# Patient Record
Sex: FEMALE | Race: WHITE | NOT HISPANIC OR LATINO | Employment: OTHER | URBAN - METROPOLITAN AREA
[De-identification: names, ages, dates, MRNs, and addresses within clinical notes are randomized per-mention and may not be internally consistent; named-entity substitution may affect disease eponyms.]

---

## 2024-02-01 ENCOUNTER — HOSPITAL ENCOUNTER (OUTPATIENT)
Dept: RADIOLOGY | Facility: HOSPITAL | Age: 84
Discharge: HOME | End: 2024-02-01
Payer: MEDICARE

## 2024-02-01 DIAGNOSIS — M81.8 OTHER OSTEOPOROSIS WITHOUT CURRENT PATHOLOGICAL FRACTURE: ICD-10-CM

## 2024-02-01 DIAGNOSIS — Z13.820 ENCOUNTER FOR SCREENING FOR OSTEOPOROSIS: ICD-10-CM

## 2024-02-01 PROCEDURE — 77080 DXA BONE DENSITY AXIAL: CPT

## 2024-02-01 PROCEDURE — 77080 DXA BONE DENSITY AXIAL: CPT | Performed by: RADIOLOGY

## 2024-05-08 ENCOUNTER — HOSPITAL ENCOUNTER (OUTPATIENT)
Dept: RADIOLOGY | Facility: HOSPITAL | Age: 84
Discharge: HOME | End: 2024-05-08
Payer: MEDICARE

## 2024-05-08 ENCOUNTER — HOSPITAL ENCOUNTER (OUTPATIENT)
Dept: RADIOLOGY | Facility: HOSPITAL | Age: 84
End: 2024-05-08
Payer: MEDICARE

## 2024-05-08 DIAGNOSIS — M25.552 PAIN IN LEFT HIP: ICD-10-CM

## 2024-05-08 PROCEDURE — 73552 X-RAY EXAM OF FEMUR 2/>: CPT | Mod: LT

## 2024-05-08 PROCEDURE — 73552 X-RAY EXAM OF FEMUR 2/>: CPT | Mod: LEFT SIDE | Performed by: RADIOLOGY

## 2024-06-21 ENCOUNTER — APPOINTMENT (OUTPATIENT)
Dept: CARDIOLOGY | Facility: CLINIC | Age: 84
End: 2024-06-21
Payer: MEDICARE

## 2024-06-21 VITALS
DIASTOLIC BLOOD PRESSURE: 56 MMHG | SYSTOLIC BLOOD PRESSURE: 102 MMHG | WEIGHT: 156.6 LBS | RESPIRATION RATE: 18 BRPM | HEIGHT: 64 IN | BODY MASS INDEX: 26.73 KG/M2 | HEART RATE: 76 BPM | OXYGEN SATURATION: 97 %

## 2024-06-21 DIAGNOSIS — Z79.01 CHRONIC ANTICOAGULATION: ICD-10-CM

## 2024-06-21 DIAGNOSIS — I48.0 PAROXYSMAL ATRIAL FIBRILLATION (MULTI): Primary | ICD-10-CM

## 2024-06-21 DIAGNOSIS — I34.0 MITRAL VALVE INSUFFICIENCY, UNSPECIFIED ETIOLOGY: ICD-10-CM

## 2024-06-21 PROBLEM — E11.9 DIABETES MELLITUS (MULTI): Status: ACTIVE | Noted: 2024-06-21

## 2024-06-21 PROBLEM — E87.20 METABOLIC ACIDOSIS: Status: ACTIVE | Noted: 2023-11-01

## 2024-06-21 PROBLEM — E11.9 TYPE 2 DIABETES MELLITUS WITHOUT COMPLICATION (MULTI): Status: ACTIVE | Noted: 2023-06-02

## 2024-06-21 PROBLEM — E79.0 HYPERURICEMIA: Status: ACTIVE | Noted: 2023-11-01

## 2024-06-21 PROBLEM — R80.9 PROTEINURIA: Status: ACTIVE | Noted: 2023-11-01

## 2024-06-21 PROBLEM — N28.1 SIMPLE RENAL CYST: Status: ACTIVE | Noted: 2023-11-01

## 2024-06-21 PROBLEM — I10 BENIGN ESSENTIAL HYPERTENSION: Status: ACTIVE | Noted: 2023-06-02

## 2024-06-21 PROBLEM — R00.1 BRADYCARDIA: Status: ACTIVE | Noted: 2024-06-21

## 2024-06-21 PROBLEM — M19.91 LOCALIZED, PRIMARY OSTEOARTHRITIS: Status: ACTIVE | Noted: 2024-05-15

## 2024-06-21 PROBLEM — D50.9 IRON DEFICIENCY ANEMIA: Status: ACTIVE | Noted: 2023-11-01

## 2024-06-21 PROBLEM — N18.9 ANEMIA IN CHRONIC KIDNEY DISEASE: Status: ACTIVE | Noted: 2023-11-01

## 2024-06-21 PROBLEM — G45.9 TRANSIENT ISCHEMIC ATTACK: Status: ACTIVE | Noted: 2023-06-02

## 2024-06-21 PROBLEM — N39.46 MIXED STRESS AND URGE URINARY INCONTINENCE: Status: ACTIVE | Noted: 2023-06-02

## 2024-06-21 PROBLEM — E78.5 HYPERLIPIDEMIA: Status: ACTIVE | Noted: 2024-06-21

## 2024-06-21 PROBLEM — G56.02 CARPAL TUNNEL SYNDROME OF LEFT WRIST: Status: ACTIVE | Noted: 2023-06-02

## 2024-06-21 PROBLEM — D63.1 ANEMIA IN CHRONIC KIDNEY DISEASE: Status: ACTIVE | Noted: 2023-11-01

## 2024-06-21 PROBLEM — I12.9 CHRONIC KIDNEY DISEASE DUE TO HYPERTENSION: Status: ACTIVE | Noted: 2024-06-21

## 2024-06-21 PROBLEM — S93.409A SPRAIN OF ANKLE: Status: ACTIVE | Noted: 2023-06-02

## 2024-06-21 PROBLEM — M25.562 ARTHRALGIA OF LEFT KNEE: Status: ACTIVE | Noted: 2024-05-15

## 2024-06-21 PROBLEM — E83.52 HYPERCALCEMIA: Status: ACTIVE | Noted: 2023-11-01

## 2024-06-21 PROBLEM — E07.9 THYROID DISORDER: Status: ACTIVE | Noted: 2024-06-21

## 2024-06-21 PROBLEM — E66.01 MORBID OBESITY (MULTI): Status: ACTIVE | Noted: 2024-06-21

## 2024-06-21 PROBLEM — K50.90 CROHN DISEASE (MULTI): Status: ACTIVE | Noted: 2023-06-02

## 2024-06-21 PROBLEM — I10 HYPERTENSION: Status: ACTIVE | Noted: 2024-06-21

## 2024-06-21 PROBLEM — K50.10 CROHN'S DISEASE OF LARGE BOWEL (MULTI): Status: ACTIVE | Noted: 2023-06-02

## 2024-06-21 PROBLEM — N18.32 STAGE 3B CHRONIC KIDNEY DISEASE (MULTI): Status: ACTIVE | Noted: 2023-06-02

## 2024-06-21 PROBLEM — N17.9 ACUTE RENAL FAILURE (CMS-HCC): Status: ACTIVE | Noted: 2023-11-01

## 2024-06-21 PROBLEM — M79.609 PAIN IN LIMB: Status: ACTIVE | Noted: 2024-05-15

## 2024-06-21 PROBLEM — R42 LIGHTHEADED: Status: ACTIVE | Noted: 2023-11-02

## 2024-06-21 PROBLEM — K85.90 PANCREATITIS (HHS-HCC): Status: ACTIVE | Noted: 2024-06-21

## 2024-06-21 PROBLEM — N25.81 SECONDARY HYPERPARATHYROIDISM (MULTI): Status: ACTIVE | Noted: 2023-11-01

## 2024-06-21 PROBLEM — N18.30 STAGE 3 CHRONIC KIDNEY DISEASE (MULTI): Status: ACTIVE | Noted: 2023-06-02

## 2024-06-21 PROBLEM — M25.552 LEFT HIP PAIN: Status: ACTIVE | Noted: 2024-05-08

## 2024-06-21 PROBLEM — M85.852 OSTEOPENIA OF NECK OF LEFT FEMUR: Status: ACTIVE | Noted: 2023-06-02

## 2024-06-21 PROCEDURE — 1160F RVW MEDS BY RX/DR IN RCRD: CPT | Performed by: NURSE PRACTITIONER

## 2024-06-21 PROCEDURE — 3074F SYST BP LT 130 MM HG: CPT | Performed by: NURSE PRACTITIONER

## 2024-06-21 PROCEDURE — 3078F DIAST BP <80 MM HG: CPT | Performed by: NURSE PRACTITIONER

## 2024-06-21 PROCEDURE — 93000 ELECTROCARDIOGRAM COMPLETE: CPT | Performed by: NURSE PRACTITIONER

## 2024-06-21 PROCEDURE — 99214 OFFICE O/P EST MOD 30 MIN: CPT | Performed by: NURSE PRACTITIONER

## 2024-06-21 PROCEDURE — 1159F MED LIST DOCD IN RCRD: CPT | Performed by: NURSE PRACTITIONER

## 2024-06-21 PROCEDURE — 1036F TOBACCO NON-USER: CPT | Performed by: NURSE PRACTITIONER

## 2024-06-21 RX ORDER — CHOLECALCIFEROL (VITAMIN D3)
CRYSTALS MISCELLANEOUS
COMMUNITY

## 2024-06-21 RX ORDER — OXYBUTYNIN CHLORIDE 10 MG/1
TABLET, EXTENDED RELEASE ORAL
COMMUNITY
Start: 2023-11-02

## 2024-06-21 RX ORDER — DAPAGLIFLOZIN 10 MG/1
10 TABLET, FILM COATED ORAL DAILY
COMMUNITY
Start: 2023-11-02

## 2024-06-21 RX ORDER — SIMVASTATIN 20 MG/1
TABLET, FILM COATED ORAL
COMMUNITY
Start: 2023-11-02

## 2024-06-21 RX ORDER — APIXABAN 2.5 MG/1
TABLET, FILM COATED ORAL
COMMUNITY
Start: 2016-05-24

## 2024-06-21 RX ORDER — LISINOPRIL 10 MG/1
TABLET ORAL
COMMUNITY
Start: 2016-05-24

## 2024-06-21 RX ORDER — PIOGLITAZONEHYDROCHLORIDE 15 MG/1
TABLET ORAL
COMMUNITY
End: 2024-06-21 | Stop reason: WASHOUT

## 2024-06-21 RX ORDER — ALENDRONATE SODIUM 70 MG/1
TABLET ORAL
COMMUNITY
Start: 2016-05-24 | End: 2024-06-21 | Stop reason: WASHOUT

## 2024-06-21 RX ORDER — DILTIAZEM HYDROCHLORIDE 120 MG/1
240 TABLET, FILM COATED ORAL 2 TIMES DAILY
COMMUNITY

## 2024-06-21 RX ORDER — MESALAMINE 1.2 G/1
TABLET, DELAYED RELEASE ORAL
COMMUNITY
End: 2024-06-21 | Stop reason: WASHOUT

## 2024-06-21 RX ORDER — TRAMADOL HYDROCHLORIDE 50 MG/1
TABLET ORAL
COMMUNITY
End: 2024-06-21 | Stop reason: WASHOUT

## 2024-06-21 RX ORDER — HYDROCHLOROTHIAZIDE 12.5 MG/1
12.5 CAPSULE ORAL DAILY
COMMUNITY

## 2024-06-21 RX ORDER — PREGABALIN 50 MG/1
50 CAPSULE ORAL
COMMUNITY
Start: 2024-05-20 | End: 2024-06-21 | Stop reason: WASHOUT

## 2024-06-21 RX ORDER — LEVOTHYROXINE SODIUM 75 UG/1
TABLET ORAL
COMMUNITY
Start: 2016-05-24

## 2024-06-21 RX ORDER — METOPROLOL SUCCINATE 25 MG/1
TABLET, EXTENDED RELEASE ORAL
COMMUNITY
End: 2024-06-21 | Stop reason: WASHOUT

## 2024-06-21 NOTE — PROGRESS NOTES
Name : Courtney Barajas   : 1940   MRN : [id number]   ENC Date : 2024    CC: Annual Exam and Atrial Fibrillation     HPI:    Courtney Barajas is a 84 y.o. female with PMHx sig for pAfib on DOAC, CVA that was treated with TPA (may 2016) no deficits, HTN, HLD, DM, Hypothyroidism, CKD & Crohns who presents today for annual cardiovascular follow up.     Problem with her leg. Pain in the left thigh, got a shot in the knee, doing better, following Dr Page.    Denies any chest pain, pressure, SOB/MORENO, PND, orthopnea, LE edema, palpitations, lightheadedness, dizziness, or syncope.     stays active through her [employer], volunteering at the food pantry.     Retired Teacher: Keturah Hwang.     CV Diagnostics:  Echo 2016: EF 55-60%, impaired relaxation, mild to mod MR, trace TR, RVSP 27 mmhg, trivial pericardial effusion    ROS: unless otherwise noted in the history of present illness, all other systems were reviewed and they are negative for complaints     Allergies:  Iodinated contrast media    Current Outpatient Medications   Medication Instructions    cholecalciferol, vitamin D3, (cholecalciferol, vit D3,,bulk,) crystals     dapagliflozin propanediol (FARXIGA) 10 mg, oral, Daily    dilTIAZem (CARDIZEM) 240 mg, oral, 2 times daily    Eliquis 2.5 mg tablet     hydroCHLOROthiazide (MICROZIDE) 12.5 mg, oral, Daily    levothyroxine (Synthroid, Levoxyl) 75 mcg tablet     lisinopril 10 mg tablet     oxybutynin XL (Ditropan-XL) 10 mg 24 hr tablet     simvastatin (Zocor) 20 mg tablet         Last Labs:  CBC  Lab Results   Component Value Date    WBC 7.8 2020    HGB 10.5 (L) 2020    HCT 34.2 (L) 2020     2020     2020       CMP  Lab Results   Component Value Date    CALCIUM 9.9 2020    PHOS 3.8 10/17/2019    PROT 7.1 2020    ALBUMIN 4.1 2020    AST 15 2020    ALT 11 2020    ALKPHOS 64 2020    BILITOT 0.5 2020       BMP   Lab Results  "  Component Value Date     01/31/2020    K 4.8 01/31/2020     01/31/2020    CO2 27 01/31/2020    GLUCOSE 109 (H) 01/31/2020    BUN 37 (H) 01/31/2020    CREATININE 1.50 (H) 01/31/2020       LIPID PANEL   No results found for: \"CHOL\", \"TRIG\", \"HDL\", \"CHHDL\", \"LDLF\", \"VLDL\", \"NHDL\"    RENAL FUNCTION PANEL   Lab Results   Component Value Date    GLUCOSE 109 (H) 01/31/2020     01/31/2020    K 4.8 01/31/2020     01/31/2020    CO2 27 01/31/2020    ANIONGAP 13 01/31/2020    BUN 37 (H) 01/31/2020    CREATININE 1.50 (H) 01/31/2020    CALCIUM 9.9 01/31/2020    PHOS 3.8 10/17/2019    ALBUMIN 4.1 01/31/2020        No results found for: \"BNP\", \"HGBA1C\"  I have reviewed the above labs & diagnostics    Last Recorded Vitals:  Vitals:    06/21/24 0903   BP: 102/56   BP Location: Left arm   Patient Position: Sitting   Pulse: 76   Resp: 18   SpO2: 97%   Weight: 71 kg (156 lb 9.6 oz)   Height: 1.626 m (5' 4\")     Physical Exam:  On exam Ms. Courtney Barajas appears her stated age, is alert and oriented x3, and in no acute distress. Her sclera are anicteric and her oropharynx has moist mucous membranes. Her neck is supple and without thyromegaly. The JVP is ~5 cm of water above the right atrium. Her cardiac exam has regular rhythm, normal S1, S2. No S3/4. There are no murmurs. Her lungs are clear to auscultation bilaterally and there is no dullness to percussion. Her abdomen is soft, nontender with normoactive bowel sounds. There is no HJR. The extremities are warm and without edema. The skin is dry. There is no rash present. The distal pulses are 2-3+ in all four extremities. Her mood and affect are appropriate for todays encounter.     Assessment/Plan:  1.p Afib. Today she is in sinus rhythm. No c/o palpitations  - c/w Diltiazem 240mg every day   - c/w Eliquis 2.5mg BID (2/2 age)     2. Hypertension. /56 mmHg. Well controlled on current regimen     3. HLD. Tolerating statin     4. Anticoagulation, long " term. No embolic events nor issues with bleeding.  - counseled on the bleeding risk with DOAC and how to minimize it.     5. Aortic Stenosis. Mild to moderate on echo in 2016. Clinically mild on exam. Repeat echo for surveillance.    Follow up after echo to review results, if stable then will follow up next year    Tracy M Schwab, APRN-CNP

## 2024-08-20 ENCOUNTER — HOSPITAL ENCOUNTER (OUTPATIENT)
Dept: CARDIOLOGY | Facility: HOSPITAL | Age: 84
Discharge: HOME | End: 2024-08-20
Payer: MEDICARE

## 2024-08-20 DIAGNOSIS — I34.0 MITRAL VALVE INSUFFICIENCY, UNSPECIFIED ETIOLOGY: ICD-10-CM

## 2024-08-20 LAB
AORTIC VALVE MEAN GRADIENT: 4.5 MMHG
AORTIC VALVE PEAK VELOCITY: 1.43 M/S
AV PEAK GRADIENT: 8.2 MMHG
AVA (PEAK VEL): 2.1 CM2
AVA (VTI): 2.26 CM2
EJECTION FRACTION APICAL 4 CHAMBER: 55.5
EJECTION FRACTION: 56 %
LEFT ATRIUM VOLUME AREA LENGTH INDEX BSA: 16.9 ML/M2
LEFT VENTRICLE INTERNAL DIMENSION DIASTOLE: 4.92 CM (ref 3.5–6)
LEFT VENTRICULAR OUTFLOW TRACT DIAMETER: 1.93 CM
LV EJECTION FRACTION BIPLANE: 56 %
MITRAL VALVE E/A RATIO: 0.62
RIGHT VENTRICLE FREE WALL PEAK S': 10 CM/S
RIGHT VENTRICLE PEAK SYSTOLIC PRESSURE: 22.6 MMHG
TRICUSPID ANNULAR PLANE SYSTOLIC EXCURSION: 2.2 CM

## 2024-08-20 PROCEDURE — 93306 TTE W/DOPPLER COMPLETE: CPT | Performed by: INTERNAL MEDICINE

## 2024-08-20 PROCEDURE — 93306 TTE W/DOPPLER COMPLETE: CPT

## 2024-08-28 ENCOUNTER — APPOINTMENT (OUTPATIENT)
Dept: CARDIOLOGY | Facility: CLINIC | Age: 84
End: 2024-08-28
Payer: MEDICARE

## 2024-08-28 DIAGNOSIS — I35.0 AORTIC VALVE STENOSIS, ETIOLOGY OF CARDIAC VALVE DISEASE UNSPECIFIED: Primary | ICD-10-CM

## 2024-08-28 PROCEDURE — 99213 OFFICE O/P EST LOW 20 MIN: CPT | Performed by: NURSE PRACTITIONER

## 2024-08-28 PROCEDURE — 1159F MED LIST DOCD IN RCRD: CPT | Performed by: NURSE PRACTITIONER

## 2024-08-28 RX ORDER — DENOSUMAB 60 MG/ML
60 INJECTION SUBCUTANEOUS
COMMUNITY

## 2024-08-28 NOTE — PROGRESS NOTES
Name : Courtney Barajas   : 1940   MRN : [id number]   ENC Date : 2024    CC: Results     HPI:    Courtney Barajas is a 84 y.o. female with PMHx sig for pAfib on DOAC, CVA that was treated with TPA (may 2016) no deficits, HTN, HLD, DM, Hypothyroidism, CKD & Crohns who presents today to review echo results.    Problem with her leg. Pain in the left thigh, got a shot in the knee, doing better, following Dr Page.    Denies any chest pain, pressure, SOB/MORENO, PND, orthopnea, LE edema, palpitations, lightheadedness, dizziness, or syncope.     stays active through her [employer], volunteering at the food pantry.     Retired Teacher: Keturah Hwang.     CV Diagnostics:  Echo 2016: EF 55-60%, impaired relaxation, mild to mod MR, trace TR, RVSP 27 mmhg, trivial pericardial effusion    ROS: unless otherwise noted in the history of present illness, all other systems were reviewed and they are negative for complaints     Allergies:  Iodinated contrast media    Current Outpatient Medications   Medication Instructions    cholecalciferol, vitamin D3, (cholecalciferol, vit D3,,bulk,) crystals     dapagliflozin propanediol (FARXIGA) 10 mg, oral, Daily    dilTIAZem (CARDIZEM) 240 mg, oral, 2 times daily    Eliquis 2.5 mg tablet     hydroCHLOROthiazide (MICROZIDE) 12.5 mg, oral, Daily    levothyroxine (Synthroid, Levoxyl) 75 mcg tablet     lisinopril 10 mg tablet     oxybutynin XL (Ditropan-XL) 10 mg 24 hr tablet     Prolia 60 mg, subcutaneous    simvastatin (Zocor) 20 mg tablet         Last Labs:  CBC  Lab Results   Component Value Date    WBC 7.8 2020    HGB 10.5 (L) 2020    HCT 34.2 (L) 2020     2020     2020       CMP  Lab Results   Component Value Date    CALCIUM 9.9 2020    PHOS 3.8 10/17/2019    PROT 7.1 2020    ALBUMIN 4.1 2020    AST 15 2020    ALT 11 2020    ALKPHOS 64 2020    BILITOT 0.5 2020       BMP   Lab Results   Component  "Value Date     01/31/2020    K 4.8 01/31/2020     01/31/2020    CO2 27 01/31/2020    GLUCOSE 109 (H) 01/31/2020    BUN 37 (H) 01/31/2020    CREATININE 1.50 (H) 01/31/2020       LIPID PANEL   No results found for: \"CHOL\", \"TRIG\", \"HDL\", \"CHHDL\", \"LDLF\", \"VLDL\", \"NHDL\"    RENAL FUNCTION PANEL   Lab Results   Component Value Date    GLUCOSE 109 (H) 01/31/2020     01/31/2020    K 4.8 01/31/2020     01/31/2020    CO2 27 01/31/2020    ANIONGAP 13 01/31/2020    BUN 37 (H) 01/31/2020    CREATININE 1.50 (H) 01/31/2020    CALCIUM 9.9 01/31/2020    PHOS 3.8 10/17/2019    ALBUMIN 4.1 01/31/2020        No results found for: \"BNP\", \"HGBA1C\"  I have reviewed the above labs & diagnostics    Last Recorded Vitals:  There were no vitals filed for this visit.    Physical Exam:  A+Ox3, NAD     Assessment/Plan:  1.p Afib. Today she is in sinus rhythm. No c/o palpitations  - c/w Diltiazem 240mg every day   - c/w Eliquis 2.5mg BID (2/2 age)     2. Hypertension. /56 mmHg. Well controlled on current regimen     3. HLD. Tolerating statin     4. Anticoagulation, long term. No embolic events nor issues with bleeding.  - counseled on the bleeding risk with DOAC and how to minimize it.     5. Aortic Stenosis. Mild to moderate on echo in 2016. Clinically mild on exam. Echo repeated this year, Aortic Stenosis has significantly improved. Echo results reviewed with Debbie. All questions answered. Continue surveillance.    Follow up in 1 year or as needed     Tracy M Schwab, APRN-CNP    "

## 2024-12-11 ENCOUNTER — LAB (OUTPATIENT)
Dept: LAB | Facility: LAB | Age: 84
End: 2024-12-11
Payer: MEDICARE

## 2024-12-11 DIAGNOSIS — N18.9 CHRONIC KIDNEY DISEASE, UNSPECIFIED: Primary | ICD-10-CM

## 2024-12-11 LAB
ALBUMIN SERPL BCP-MCNC: 4.3 G/DL (ref 3.4–5)
ANION GAP SERPL CALC-SCNC: 15 MMOL/L (ref 10–20)
APPEARANCE UR: CLEAR
BASOPHILS # BLD AUTO: 0.07 X10*3/UL (ref 0–0.1)
BASOPHILS NFR BLD AUTO: 0.9 %
BILIRUB UR STRIP.AUTO-MCNC: NEGATIVE MG/DL
BUN SERPL-MCNC: 26 MG/DL (ref 6–23)
CALCIUM SERPL-MCNC: 9.7 MG/DL (ref 8.6–10.3)
CHLORIDE SERPL-SCNC: 105 MMOL/L (ref 98–107)
CO2 SERPL-SCNC: 26 MMOL/L (ref 21–32)
COLOR UR: ABNORMAL
CREAT SERPL-MCNC: 1.56 MG/DL (ref 0.5–1.05)
CREAT UR-MCNC: 96.8 MG/DL (ref 20–320)
EGFRCR SERPLBLD CKD-EPI 2021: 32 ML/MIN/1.73M*2
EOSINOPHIL # BLD AUTO: 0.16 X10*3/UL (ref 0–0.4)
EOSINOPHIL NFR BLD AUTO: 2 %
ERYTHROCYTE [DISTWIDTH] IN BLOOD BY AUTOMATED COUNT: 13.7 % (ref 11.5–14.5)
FERRITIN SERPL-MCNC: 94 NG/ML (ref 8–150)
GLUCOSE SERPL-MCNC: 118 MG/DL (ref 74–99)
GLUCOSE UR STRIP.AUTO-MCNC: ABNORMAL MG/DL
HCT VFR BLD AUTO: 35.8 % (ref 36–46)
HGB BLD-MCNC: 11 G/DL (ref 12–16)
IMM GRANULOCYTES # BLD AUTO: 0.03 X10*3/UL (ref 0–0.5)
IMM GRANULOCYTES NFR BLD AUTO: 0.4 % (ref 0–0.9)
IRON SATN MFR SERPL: 28 % (ref 25–45)
IRON SERPL-MCNC: 81 UG/DL (ref 35–150)
KETONES UR STRIP.AUTO-MCNC: NEGATIVE MG/DL
LEUKOCYTE ESTERASE UR QL STRIP.AUTO: ABNORMAL
LYMPHOCYTES # BLD AUTO: 1.77 X10*3/UL (ref 0.8–3)
LYMPHOCYTES NFR BLD AUTO: 22.3 %
MAGNESIUM SERPL-MCNC: 1.94 MG/DL (ref 1.6–2.4)
MCH RBC QN AUTO: 29.8 PG (ref 26–34)
MCHC RBC AUTO-ENTMCNC: 30.7 G/DL (ref 32–36)
MCV RBC AUTO: 97 FL (ref 80–100)
MONOCYTES # BLD AUTO: 0.74 X10*3/UL (ref 0.05–0.8)
MONOCYTES NFR BLD AUTO: 9.3 %
NEUTROPHILS # BLD AUTO: 5.15 X10*3/UL (ref 1.6–5.5)
NEUTROPHILS NFR BLD AUTO: 65.1 %
NITRITE UR QL STRIP.AUTO: NEGATIVE
NRBC BLD-RTO: 0 /100 WBCS (ref 0–0)
PH UR STRIP.AUTO: 5.5 [PH]
PHOSPHATE SERPL-MCNC: 3.7 MG/DL (ref 2.5–4.9)
PLATELET # BLD AUTO: 315 X10*3/UL (ref 150–450)
POTASSIUM SERPL-SCNC: 4.6 MMOL/L (ref 3.5–5.3)
PROT UR STRIP.AUTO-MCNC: ABNORMAL MG/DL
PROT UR-ACNC: 28 MG/DL (ref 5–24)
PROT/CREAT UR: 0.29 MG/MG CREAT (ref 0–0.17)
PTH-INTACT SERPL-MCNC: 80.9 PG/ML (ref 18.5–88)
RBC # BLD AUTO: 3.69 X10*6/UL (ref 4–5.2)
RBC # UR STRIP.AUTO: NEGATIVE /UL
RBC #/AREA URNS AUTO: NORMAL /HPF
SODIUM SERPL-SCNC: 141 MMOL/L (ref 136–145)
SP GR UR STRIP.AUTO: 1.02
TIBC SERPL-MCNC: 286 UG/DL (ref 240–445)
UIBC SERPL-MCNC: 205 UG/DL (ref 110–370)
URATE SERPL-MCNC: 6 MG/DL (ref 2.3–6.7)
UROBILINOGEN UR STRIP.AUTO-MCNC: NORMAL MG/DL
WBC # BLD AUTO: 7.9 X10*3/UL (ref 4.4–11.3)
WBC #/AREA URNS AUTO: NORMAL /HPF
WBC CLUMPS #/AREA URNS AUTO: NORMAL /HPF

## 2024-12-11 PROCEDURE — 36415 COLL VENOUS BLD VENIPUNCTURE: CPT

## 2024-12-11 PROCEDURE — 83540 ASSAY OF IRON: CPT

## 2024-12-11 PROCEDURE — 82728 ASSAY OF FERRITIN: CPT

## 2024-12-11 PROCEDURE — 85025 COMPLETE CBC W/AUTO DIFF WBC: CPT

## 2024-12-11 PROCEDURE — 82570 ASSAY OF URINE CREATININE: CPT

## 2024-12-11 PROCEDURE — 84550 ASSAY OF BLOOD/URIC ACID: CPT

## 2024-12-11 PROCEDURE — 83970 ASSAY OF PARATHORMONE: CPT

## 2024-12-11 PROCEDURE — 83735 ASSAY OF MAGNESIUM: CPT

## 2024-12-11 PROCEDURE — 80069 RENAL FUNCTION PANEL: CPT

## 2024-12-11 PROCEDURE — 84156 ASSAY OF PROTEIN URINE: CPT

## 2024-12-11 PROCEDURE — 83550 IRON BINDING TEST: CPT

## 2024-12-11 PROCEDURE — 81001 URINALYSIS AUTO W/SCOPE: CPT

## 2025-06-27 ENCOUNTER — APPOINTMENT (OUTPATIENT)
Dept: CARDIOLOGY | Facility: CLINIC | Age: 85
End: 2025-06-27
Payer: MEDICARE

## 2025-07-09 ENCOUNTER — APPOINTMENT (OUTPATIENT)
Dept: CARDIOLOGY | Facility: CLINIC | Age: 85
End: 2025-07-09
Payer: MEDICARE

## 2025-07-31 ENCOUNTER — APPOINTMENT (OUTPATIENT)
Dept: CARDIOLOGY | Facility: CLINIC | Age: 85
End: 2025-07-31
Payer: MEDICARE

## 2025-07-31 VITALS
DIASTOLIC BLOOD PRESSURE: 58 MMHG | SYSTOLIC BLOOD PRESSURE: 128 MMHG | WEIGHT: 158.3 LBS | RESPIRATION RATE: 18 BRPM | OXYGEN SATURATION: 97 % | HEIGHT: 64 IN | BODY MASS INDEX: 27.02 KG/M2 | HEART RATE: 68 BPM

## 2025-07-31 DIAGNOSIS — I48.0 PAROXYSMAL ATRIAL FIBRILLATION (MULTI): Primary | ICD-10-CM

## 2025-07-31 DIAGNOSIS — E78.2 MIXED HYPERLIPIDEMIA: ICD-10-CM

## 2025-07-31 DIAGNOSIS — I10 BENIGN ESSENTIAL HYPERTENSION: ICD-10-CM

## 2025-07-31 DIAGNOSIS — Z79.01 CHRONIC ANTICOAGULATION: ICD-10-CM

## 2025-07-31 PROBLEM — R00.1 BRADYCARDIA: Status: RESOLVED | Noted: 2024-06-21 | Resolved: 2025-07-31

## 2025-07-31 PROBLEM — I35.0 AORTIC VALVE STENOSIS: Status: RESOLVED | Noted: 2024-08-28 | Resolved: 2025-07-31

## 2025-07-31 PROCEDURE — 99214 OFFICE O/P EST MOD 30 MIN: CPT | Performed by: NURSE PRACTITIONER

## 2025-07-31 PROCEDURE — 3074F SYST BP LT 130 MM HG: CPT | Performed by: NURSE PRACTITIONER

## 2025-07-31 PROCEDURE — 1160F RVW MEDS BY RX/DR IN RCRD: CPT | Performed by: NURSE PRACTITIONER

## 2025-07-31 PROCEDURE — 1159F MED LIST DOCD IN RCRD: CPT | Performed by: NURSE PRACTITIONER

## 2025-07-31 PROCEDURE — 1036F TOBACCO NON-USER: CPT | Performed by: NURSE PRACTITIONER

## 2025-07-31 PROCEDURE — 3078F DIAST BP <80 MM HG: CPT | Performed by: NURSE PRACTITIONER

## 2025-07-31 PROCEDURE — 93000 ELECTROCARDIOGRAM COMPLETE: CPT | Performed by: NURSE PRACTITIONER

## 2025-07-31 RX ORDER — DILTIAZEM HYDROCHLORIDE 240 MG/1
240 CAPSULE, COATED, EXTENDED RELEASE ORAL 2 TIMES DAILY
COMMUNITY

## 2025-07-31 NOTE — PROGRESS NOTES
Name : Courtney Barajas   : 1940   MRN : [id number]   ENC Date : 2025    CC: Atrial Fibrillation, Hypertension, Hyperlipidemia, Annual Exam, and CVA     HPI:    Courtney Barajas is a 85 y.o. female with PMHx sig for pAfib on DOAC, CVA (2016) with no residual deficits, HTN, HLD, DM, Hypothyroidism, CKD & Crohns who presents today as above.    She has done very well since her last visit. Reports no major health issues and has had no hospitalizations. Denies any chest pain, pressure, SOB/MROENO, PND, orthopnea, LE edema, palpitations, lightheadedness, dizziness, or syncope at rest or with exertions. She is compliant with her medications and reports no side effects.     Stays active through her [employer], volunteering at the food pantry.     Retired Teacher: Keturah Hwang.     CV Diagnostics:  Echo 24: EF 56%, grade I diastolic dysfunction, RVSP 23 mmHg    Echo 2016: EF 55-60%, impaired relaxation, mild to mod MR, trace TR, RVSP 27 mmhg, trivial pericardial effusion    ROS: unless otherwise noted in the history of present illness, all other systems were reviewed and they are negative for complaints     Allergies:  Iodinated contrast media    Current Outpatient Medications   Medication Instructions    cholecalciferol, vitamin D3, (cholecalciferol, vit D3,,bulk,) crystals     dapagliflozin propanediol (FARXIGA) 10 mg, Daily    dilTIAZem (CARDIZEM) 240 mg, 2 times daily    Eliquis 2.5 mg tablet     hydroCHLOROthiazide (MICROZIDE) 12.5 mg, Daily    levothyroxine (Synthroid, Levoxyl) 75 mcg tablet     lisinopril 10 mg tablet     oxybutynin XL (Ditropan-XL) 10 mg 24 hr tablet     Prolia 60 mg    simvastatin (Zocor) 20 mg tablet         Last Labs:  CBC  Lab Results   Component Value Date    WBC 7.9 2024    HGB 11.0 (L) 2024    HCT 35.8 (L) 2024    MCV 97 2024     2024       CMP  Lab Results   Component Value Date    CALCIUM 9.7 2024    PHOS 3.7 2024    PROT  "7.1 01/31/2020    ALBUMIN 4.3 12/11/2024    AST 15 01/31/2020    ALT 11 01/31/2020    ALKPHOS 64 01/31/2020    BILITOT 0.5 01/31/2020       BMP   Lab Results   Component Value Date     12/11/2024    K 4.6 12/11/2024     12/11/2024    CO2 26 12/11/2024    GLUCOSE 118 (H) 12/11/2024    BUN 26 (H) 12/11/2024    CREATININE 1.56 (H) 12/11/2024       RENAL FUNCTION PANEL   Lab Results   Component Value Date    GLUCOSE 118 (H) 12/11/2024     12/11/2024    K 4.6 12/11/2024     12/11/2024    CO2 26 12/11/2024    ANIONGAP 15 12/11/2024    BUN 26 (H) 12/11/2024    CREATININE 1.56 (H) 12/11/2024    CALCIUM 9.7 12/11/2024    PHOS 3.7 12/11/2024    ALBUMIN 4.3 12/11/2024      I have reviewed the above labs & diagnostics    Last Recorded Vitals:  Vitals:    07/31/25 1023   BP: 128/58   BP Location: Left arm   Patient Position: Sitting   Pulse: 68   Resp: 18   SpO2: 97%   Weight: 71.8 kg (158 lb 4.8 oz)   Height: 1.626 m (5' 4\")     Physical Exam:  On exam Ms. Courtney Barajas appears her stated age, is alert and oriented x3, and in no acute distress. Her sclera are anicteric and her oropharynx has moist mucous membranes. Her neck is supple and without thyromegaly. The JVP is ~5 cm of water above the right atrium. Her cardiac exam has regular rhythm, normal S1, S2. No S3/4. There are no murmurs. Her lungs are clear to auscultation bilaterally and there is no dullness to percussion. Her abdomen is soft, nontender with normoactive bowel sounds. There is no HJR. The extremities are warm and without edema. The skin is dry. There is no rash present. The distal pulses are 2-3+ in all four extremities. Her mood and affect are appropriate for todays encounter.     Assessment/Plan:  pAfib. Today she is in sinus rhythm. No c/o palpitations  - c/w Diltiazem   - c/w Eliquis 2.5mg BID     2. Hypertension. /58 mmHg. Well controlled on current regimen     3. HLD. Tolerating statin therapy     4. Anticoagulation, " long term. No embolic events nor issues with bleeding.  - counseled on the bleeding risk with DOAC and how to minimize it.     Follow up with Dr. Pham in 6 month to establish collaborative care & me in 1 year.     Tracy M Schwab, APRN-CNP

## 2026-02-05 ENCOUNTER — APPOINTMENT (OUTPATIENT)
Dept: CARDIOLOGY | Facility: CLINIC | Age: 86
End: 2026-02-05
Payer: MEDICARE

## 2026-07-31 ENCOUNTER — APPOINTMENT (OUTPATIENT)
Dept: CARDIOLOGY | Facility: CLINIC | Age: 86
End: 2026-07-31
Payer: MEDICARE